# Patient Record
Sex: MALE
[De-identification: names, ages, dates, MRNs, and addresses within clinical notes are randomized per-mention and may not be internally consistent; named-entity substitution may affect disease eponyms.]

---

## 2019-04-09 ENCOUNTER — HOSPITAL ENCOUNTER (EMERGENCY)
Dept: HOSPITAL 42 - ED | Age: 24
LOS: 1 days | Discharge: HOME | End: 2019-04-10
Payer: COMMERCIAL

## 2019-04-09 VITALS — RESPIRATION RATE: 18 BRPM | OXYGEN SATURATION: 98 %

## 2019-04-09 DIAGNOSIS — H66.90: Primary | ICD-10-CM

## 2019-04-09 NOTE — ED PDOC
Arrival/HPI





- General


Chief Complaint: Headache


Time Seen by Provider: 04/09/19 23:25


Historian: Patient





- History of Present Illness


Narrative History of Present Illness (Text): 


04/09/19 23:48


Rakesh Aldrich is a 23 year old male smoker, with no significant past

medical history, who presents to the ED complaining of headache. Patient states 

while at work today he began experiencing a posterior headache with associated 

dizziness and left ear pain. Patient states he took Acetaminophen with some 

relief but notes he still feels dizzy. Patient denies any fever, chills, chest 

pain, shortness of breath, nausea, vomiting, diarrhea, urinary symptoms, back 

pain, neck pain, headache, dizziness, or any other complaints.


Symptom Onset: Gradual


Symptom Course: Unchanged


Activities at Onset: Light


Context: Home





Past Medical History





- Provider Review


Nursing Documentation Reviewed: Yes





- Infectious Disease


Hx of Infectious Diseases: None





- Cardiac


Hx Cardiac Disorders: No





- Psychiatric


Hx Substance Use: No





- Anesthesia


Hx Anesthesia: No





Family/Social History





- Physician Review


Nursing Documentation Reviewed: Yes


Family/Social History: Unknown Family HX


Smoking Status: Never Smoked


Hx Alcohol Use: No


Hx Substance Use: No





Allergies/Home Meds


Allergies/Adverse Reactions: 


Allergies





No Known Allergies Allergy (Verified 04/09/19 23:17)


   











Review of Systems





- Physician Review


All systems were reviewed & negative as marked: Yes





- Review of Systems


Constitutional: Normal.  absent: Fevers


Eyes: Normal


ENT: Other (+left ear pain)


Respiratory: Normal.  absent: SOB, Cough


Cardiovascular: Normal.  absent: Chest Pain


Gastrointestinal: Normal.  absent: Abdominal Pain, Diarrhea, Nausea, Vomiting


Genitourinary Male: Normal.  absent: Dysuria, Frequency, Hematuria, Urinary 

Output Changes


Musculoskeletal: Normal.  absent: Back Pain, Neck Pain


Skin: Normal.  absent: Rash


Neurological: Headache, Dizziness


Endocrine: Normal


Hemo/Lymphatic: Normal


Psychiatric: Normal





Physical Exam


Vital Signs Reviewed: Yes





Vital Signs











  Temp Pulse Resp BP Pulse Ox


 


 04/09/19 23:17  98.2 F  62  18  136/76  98











Temperature: Afebrile


Blood Pressure: Normal


Pulse: Regular


Respiratory Rate: Normal


Appearance: Positive for: Well-Appearing, Non-Toxic, Comfortable


Pain Distress: None


Mental Status: Positive for: Alert and Oriented X 3





- Systems Exam


Head: Present: Atraumatic, Normocephalic


Pupils: Present: PERRL


Extroacular Muscles: Present: EOMI


Conjunctiva: Present: Normal


Ears: Present: Erythema (Left TM injection), Normal Canal.  No: TM Bulging, 

Fluid, TM Perf


Mouth: Present: Moist Mucous Membranes


Pharnyx: Present: Normal.  No: ERYTHEMA, EXUDATE, TONSILS ENLARGED, Peritonsilar

Swelling, Uvular Deviation, Muffled/Hoarse Voice, Strider, Soft Palate/Uvular 

Edema


Nose (External): Present: Atraumatic


Nose (Internal): Present: Normal Inspection


Neck: Present: Normal Range of Motion.  No: Meningeal Signs, MIDLINE TENDERNESS,

Paraspinal Tenderness


Respiratory/Chest: Present: Clear to Auscultation, Good Air Exchange.  No: 

Respiratory Distress, Accessory Muscle Use


Cardiovascular: Present: Regular Rate and Rhythm, Normal S1, S2.  No: Murmurs


Abdomen: No: Tenderness, Distention, Peritoneal Signs


Back: Present: Normal Inspection


Upper Extremity: Present: Normal Inspection.  No: Cyanosis, Edema


Lower Extremity: Present: Normal Inspection.  No: Edema


Neurological: Present: GCS=15, CN II-XII Intact, Speech Normal


Skin: Present: Warm, Dry, Normal Color.  No: Rashes


Psychiatric: Present: Alert, Oriented x 3, Normal Insight, Normal Concentration





Medical Decision Making


ED Course and Treatment: 


04/09/19 23:48


Impression:


23 year old male complaining of posterior headache, left ear pain, and 

dizziness.





Plan:


-- CT Head w/o contrast


-- Antivert


-- Reassess and disposition





Progress Notes:


04/10/19 01:41


CT Head:


Normal size of the ventricles and extra-axial spaces for the patient's age.  

Normal white matter tracts of the supratentorial brain.  Normal basal ganglia 

and thalami.  Normal brainstem.  Normal cerebellum.        


There is no demonstrated extra-axial, intraparenchymal, or intraventricular 

hemorrhage.


There are no findings of an acute ischemic infarction.  


Normal calvarium.  There is no demonstrated fracture.


Normal soft tissue structures. 


Normal visualized paranasal sinuses.


IMPRESSION:


Normal unenhanced CT scan of the brain.


Electronically signed on Apr 10, 2019 1:38:19 AM EDT by:


Josh Jaramillo M.D., Certified by [facility]BR, MSK, Neuroradiology





- RAD Interpretation


: Radiologist





- Scribe Statement


The provider has reviewed the documentation as recorded by the Scribe


Namita Armando





Provider Jeannineibraghavendra Attestation:


All medical record entries made by the Scribe were at my direction and 

personally dictated by me. I have reviewed the chart and agree that the record 

accurately reflects my personal performance of the history, physical exam, 

medical decision making, and the department course for this patient. I have also

personally directed, reviewed, and agree with the discharge instructions and dis

position.








Disposition/Present on Arrival





- Present on Arrival


Any Indicators Present on Arrival: No


History of DVT/PE: No


History of Uncontrolled Diabetes: No


Urinary Catheter: No


History of Decub. Ulcer: No


History Surgical Site Infection Following: None





- Disposition


Have Diagnosis and Disposition been Completed?: Yes


Diagnosis: 


 Otitis media





Disposition: HOME/ ROUTINE


Disposition Time: 02:15


Condition: GOOD


Discharge Instructions (ExitCare):  Ear Infections (Otitis Media)


Prescriptions: 


Amoxicillin 875 mg PO BID #20 tab


Referrals: 


Narendra Smalls DO [Staff Provider] - Follow up with primary


Harinder You APN [Primary Care Provider] - Follow up with primary


Forms:  [employer] (English), WORK NOTE

## 2019-04-10 VITALS — DIASTOLIC BLOOD PRESSURE: 68 MMHG | TEMPERATURE: 98.1 F | SYSTOLIC BLOOD PRESSURE: 133 MMHG | HEART RATE: 71 BPM

## 2019-04-10 NOTE — CT
Date of service: 



04/10/2019



PROCEDURE:  CT HEAD WITHOUT CONTRAST.



HISTORY:

ha



COMPARISON:

None available.



TECHNIQUE:

Axial computed tomography images were obtained through the head/brain 

without intravenous contrast.  



Supplemental Coronal and Sagittal projections created and reviewed.



Radiation dose:



Total exam DLP = 941.71 mGy-cm.



This CT exam was performed using one or more of the following dose 

reduction techniques: Automated exposure control, adjustment of the 

mA and/or kV according to patient size, and/or use of iterative 

reconstruction technique.



FINDINGS:



HEMORRHAGE:

No intracranial hemorrhage. 



BRAIN:

No mass effect or edema.  No atrophy or chronic microvascular 

ischemic changes.



VENTRICLES:

Unremarkable. No hydrocephalus. 



CALVARIUM:

Unremarkable.



PARANASAL SINUSES:

Unremarkable as visualized. No significant inflammatory changes.



MASTOID AIR CELLS:

Unremarkable as visualized. No inflammatory changes.



OTHER FINDINGS:

None.



IMPRESSION:

No acute intracranial abnormalities. No significant findings to 

account for the clinical presentation.  



________________________________________________



Concordant results (preliminary interpretation) provided by [facility].



Procedure Completed: 00:45.



Preliminary Report: Interpreted and electronically signed: 01:38.



Final Interpretation: 08:27.